# Patient Record
Sex: FEMALE | Race: WHITE | NOT HISPANIC OR LATINO | Employment: FULL TIME | URBAN - METROPOLITAN AREA
[De-identification: names, ages, dates, MRNs, and addresses within clinical notes are randomized per-mention and may not be internally consistent; named-entity substitution may affect disease eponyms.]

---

## 2024-07-26 ENCOUNTER — HOSPITAL ENCOUNTER (EMERGENCY)
Facility: HOSPITAL | Age: 49
Discharge: HOME | End: 2024-07-26
Attending: EMERGENCY MEDICINE
Payer: COMMERCIAL

## 2024-07-26 ENCOUNTER — APPOINTMENT (EMERGENCY)
Dept: RADIOLOGY | Facility: HOSPITAL | Age: 49
End: 2024-07-26
Payer: COMMERCIAL

## 2024-07-26 VITALS
TEMPERATURE: 97.9 F | HEART RATE: 51 BPM | RESPIRATION RATE: 13 BRPM | SYSTOLIC BLOOD PRESSURE: 135 MMHG | OXYGEN SATURATION: 100 % | DIASTOLIC BLOOD PRESSURE: 64 MMHG

## 2024-07-26 DIAGNOSIS — M79.89 LEG SWELLING: ICD-10-CM

## 2024-07-26 DIAGNOSIS — R07.9 CHEST PAIN: Primary | ICD-10-CM

## 2024-07-26 LAB
2HR DELTA HS TROPONIN: <-1 NG/L
ALBUMIN SERPL BCG-MCNC: 3.9 G/DL (ref 3.5–5)
ALP SERPL-CCNC: 113 U/L (ref 34–104)
ALT SERPL W P-5'-P-CCNC: 8 U/L (ref 7–52)
ANION GAP SERPL CALCULATED.3IONS-SCNC: 4 MMOL/L (ref 4–13)
APTT PPP: 35 SECONDS (ref 23–37)
AST SERPL W P-5'-P-CCNC: 11 U/L (ref 13–39)
ATRIAL RATE: 64 BPM
BASOPHILS # BLD AUTO: 0.08 THOUSANDS/ÂΜL (ref 0–0.1)
BASOPHILS NFR BLD AUTO: 1 % (ref 0–1)
BILIRUB SERPL-MCNC: 0.33 MG/DL (ref 0.2–1)
BNP SERPL-MCNC: 275 PG/ML (ref 0–100)
BUN SERPL-MCNC: 4 MG/DL (ref 5–25)
CALCIUM SERPL-MCNC: 8.7 MG/DL (ref 8.4–10.2)
CARDIAC TROPONIN I PNL SERPL HS: 3 NG/L
CARDIAC TROPONIN I PNL SERPL HS: <2 NG/L
CHLORIDE SERPL-SCNC: 107 MMOL/L (ref 96–108)
CO2 SERPL-SCNC: 28 MMOL/L (ref 21–32)
CREAT SERPL-MCNC: 0.63 MG/DL (ref 0.6–1.3)
EOSINOPHIL # BLD AUTO: 0.43 THOUSAND/ÂΜL (ref 0–0.61)
EOSINOPHIL NFR BLD AUTO: 5 % (ref 0–6)
ERYTHROCYTE [DISTWIDTH] IN BLOOD BY AUTOMATED COUNT: 18.6 % (ref 11.6–15.1)
GFR SERPL CREATININE-BSD FRML MDRD: 106 ML/MIN/1.73SQ M
GLUCOSE SERPL-MCNC: 93 MG/DL (ref 65–140)
HCT VFR BLD AUTO: 29.5 % (ref 34.8–46.1)
HGB BLD-MCNC: 8.2 G/DL (ref 11.5–15.4)
IMM GRANULOCYTES # BLD AUTO: 0.04 THOUSAND/UL (ref 0–0.2)
IMM GRANULOCYTES NFR BLD AUTO: 0 % (ref 0–2)
INR PPP: 1.02 (ref 0.84–1.19)
LYMPHOCYTES # BLD AUTO: 1.74 THOUSANDS/ÂΜL (ref 0.6–4.47)
LYMPHOCYTES NFR BLD AUTO: 19 % (ref 14–44)
MCH RBC QN AUTO: 20.7 PG (ref 26.8–34.3)
MCHC RBC AUTO-ENTMCNC: 27.8 G/DL (ref 31.4–37.4)
MCV RBC AUTO: 74 FL (ref 82–98)
MONOCYTES # BLD AUTO: 0.98 THOUSAND/ÂΜL (ref 0.17–1.22)
MONOCYTES NFR BLD AUTO: 11 % (ref 4–12)
NEUTROPHILS # BLD AUTO: 5.84 THOUSANDS/ÂΜL (ref 1.85–7.62)
NEUTS SEG NFR BLD AUTO: 64 % (ref 43–75)
NRBC BLD AUTO-RTO: 0 /100 WBCS
P AXIS: 12 DEGREES
PLATELET # BLD AUTO: 385 THOUSANDS/UL (ref 149–390)
PMV BLD AUTO: 8.5 FL (ref 8.9–12.7)
POTASSIUM SERPL-SCNC: 3.4 MMOL/L (ref 3.5–5.3)
PR INTERVAL: 120 MS
PROT SERPL-MCNC: 6.9 G/DL (ref 6.4–8.4)
PROTHROMBIN TIME: 13.8 SECONDS (ref 11.6–14.5)
QRS AXIS: 57 DEGREES
QRSD INTERVAL: 78 MS
QT INTERVAL: 420 MS
QTC INTERVAL: 433 MS
RBC # BLD AUTO: 3.97 MILLION/UL (ref 3.81–5.12)
SODIUM SERPL-SCNC: 139 MMOL/L (ref 135–147)
T WAVE AXIS: -62 DEGREES
VENTRICULAR RATE: 64 BPM
WBC # BLD AUTO: 9.11 THOUSAND/UL (ref 4.31–10.16)

## 2024-07-26 PROCEDURE — 99285 EMERGENCY DEPT VISIT HI MDM: CPT

## 2024-07-26 PROCEDURE — 93010 ELECTROCARDIOGRAM REPORT: CPT | Performed by: INTERNAL MEDICINE

## 2024-07-26 PROCEDURE — 71046 X-RAY EXAM CHEST 2 VIEWS: CPT

## 2024-07-26 PROCEDURE — 80053 COMPREHEN METABOLIC PANEL: CPT

## 2024-07-26 PROCEDURE — 85025 COMPLETE CBC W/AUTO DIFF WBC: CPT

## 2024-07-26 PROCEDURE — 96360 HYDRATION IV INFUSION INIT: CPT

## 2024-07-26 PROCEDURE — 85610 PROTHROMBIN TIME: CPT

## 2024-07-26 PROCEDURE — 36415 COLL VENOUS BLD VENIPUNCTURE: CPT

## 2024-07-26 PROCEDURE — 93005 ELECTROCARDIOGRAM TRACING: CPT

## 2024-07-26 PROCEDURE — 84484 ASSAY OF TROPONIN QUANT: CPT

## 2024-07-26 PROCEDURE — 83880 ASSAY OF NATRIURETIC PEPTIDE: CPT

## 2024-07-26 PROCEDURE — 85730 THROMBOPLASTIN TIME PARTIAL: CPT

## 2024-07-26 RX ADMIN — SODIUM CHLORIDE 1000 ML: 0.9 INJECTION, SOLUTION INTRAVENOUS at 09:37

## 2024-07-26 NOTE — DISCHARGE INSTRUCTIONS
I have placed orders for referral to both primary care and cardiology.  Please follow-up with both so you may be reevaluated for your episodes of chest pain and transient leg swelling.  Return to the ER if develop severe chest pain, difficulty breathing, vomiting, blood in vomit or stool, new persistent leg swelling, weakness, confusion, or lethargy.

## 2024-07-26 NOTE — ED PROVIDER NOTES
"History  Chief Complaint   Patient presents with    Chest Pain     Pt states that less than an hour ago \"I was at work and I felt this sharp chest pain\" Pt reports slight sob. Denies any cardiac history. Denies any radiating pain today.      Swetha is a 49-year-old female with significant past medical history of hypertension and hyperlipidemia presenting to the ED today for chest pain this morning. She reports a similar episode that was less severe at approximately lunchtime yesterday. She describes the pain this morning as a sharp stabbing, nonradiating left-sided pain. The pain started approximately 8 AM this morning and lasted about 5 minutes but has since resolved. The symptoms occurred while at rest on her way to work this morning. The symptoms do not worsen with exertion. She has never had anything like this before. No significant cardiac history. She does report associated nausea and lightheadedness  but no vomiting, fevers, chills, or diaphoresis. She does report a ED visit to [facility] for rectal bleeding 2 weeks ago. She is no longer having loose stools and denies hematochezia or melena.  Colonoscopy scheduled for 8/12. She does report associated lightheadedness since then that is worse with standing.  No loss consciousness or syncopal episodes. She has not eaten today but did have some Mountain Dew. She does smoke a pack per day. No cough or shortness of breath. No recent illness or sick contacts.  No palpitations, leg swelling, headache, abdominal pain or vomiting. No recent travel or exogenous estrogen.  No recent surgical history or trauma.  No history of DVT.      Chest Pain  Associated symptoms: nausea    Associated symptoms: no abdominal pain, no back pain, no cough, no diaphoresis, no dizziness, no fever, no headache, no numbness, no palpitations, no shortness of breath, not vomiting and no weakness        None       Past Medical History:   Diagnosis Date    Anxiety        History reviewed. " No pertinent surgical history.    History reviewed. No pertinent family history.  I have reviewed and agree with the history as documented.    E-Cigarette/Vaping     E-Cigarette/Vaping Substances     Social History     Tobacco Use    Smoking status: Every Day     Current packs/day: 1.00     Types: Cigarettes    Smokeless tobacco: Never       Review of Systems   Constitutional:  Negative for chills, diaphoresis and fever.   HENT:  Negative for congestion and sore throat.    Eyes:  Negative for visual disturbance.   Respiratory:  Negative for cough and shortness of breath.    Cardiovascular:  Positive for chest pain. Negative for palpitations and leg swelling.   Gastrointestinal:  Positive for nausea. Negative for abdominal pain, blood in stool, diarrhea and vomiting.   Genitourinary:  Negative for difficulty urinating, dysuria and hematuria.   Musculoskeletal:  Negative for back pain, neck pain and neck stiffness.   Skin:  Negative for color change.   Neurological:  Positive for light-headedness. Negative for dizziness, syncope, weakness, numbness and headaches.   All other systems reviewed and are negative.      Physical Exam  Physical Exam  Vitals and nursing note reviewed.   Constitutional:       General: She is not in acute distress.     Appearance: Normal appearance. She is well-developed and well-groomed. She is not ill-appearing or diaphoretic.   HENT:      Head: Normocephalic and atraumatic.   Eyes:      Conjunctiva/sclera: Conjunctivae normal.   Cardiovascular:      Rate and Rhythm: Normal rate and regular rhythm. No extrasystoles are present.     Pulses:           Radial pulses are 2+ on the right side and 2+ on the left side.      Heart sounds: Normal heart sounds. Heart sounds not distant. No murmur heard.     No systolic murmur is present.      No friction rub. No gallop.   Pulmonary:      Effort: Pulmonary effort is normal. No tachypnea, accessory muscle usage or respiratory distress.      Breath  sounds: Normal breath sounds. No decreased breath sounds, wheezing, rhonchi or rales.   Chest:      Chest wall: No mass, deformity, tenderness or crepitus.   Abdominal:      General: Abdomen is flat. Bowel sounds are normal. There is no distension.      Palpations: Abdomen is soft. There is no mass.      Tenderness: There is no abdominal tenderness. There is no guarding or rebound.   Musculoskeletal:      Cervical back: Normal range of motion and neck supple.      Right lower leg: No tenderness. No edema.      Left lower leg: No tenderness. No edema.   Lymphadenopathy:      Cervical: No cervical adenopathy.   Skin:     General: Skin is warm.      Coloration: Skin is not pale.   Neurological:      General: No focal deficit present.      Mental Status: She is alert.      GCS: GCS eye subscore is 4. GCS verbal subscore is 5. GCS motor subscore is 6.   Psychiatric:         Behavior: Behavior is cooperative.         Vital Signs  ED Triage Vitals   Temperature Pulse Respirations Blood Pressure SpO2   07/26/24 0902 07/26/24 0902 07/26/24 0902 07/26/24 0902 07/26/24 0902   97.9 °F (36.6 °C) 65 20 146/72 99 %      Temp Source Heart Rate Source Patient Position - Orthostatic VS BP Location FiO2 (%)   07/26/24 0902 07/26/24 0902 07/26/24 0902 07/26/24 0902 --   Temporal Monitor Sitting Right arm       Pain Score       07/26/24 0858       10 - Worst Possible Pain           Vitals:    07/26/24 1030 07/26/24 1130 07/26/24 1200 07/26/24 1230   BP: 115/58 124/58 135/65 135/64   Pulse: 56 (!) 54 55 (!) 51   Patient Position - Orthostatic VS:             Visual Acuity      ED Medications  Medications   sodium chloride 0.9 % bolus 1,000 mL (0 mL Intravenous Stopped 7/26/24 1037)       Diagnostic Studies  Results Reviewed       Procedure Component Value Units Date/Time    HS Troponin I 2hr [[id number]]  (Normal) Collected: 07/26/24 1112    Lab Status: Final result Specimen: Blood from Arm, Left Updated: 07/26/24 1203     hs TnI 2hr <2  ng/L      Delta 2hr hsTnI <-1 ng/L     B-Type Natriuretic Peptide(BNP) [[id number]]  (Abnormal) Collected: 07/26/24 0907    Lab Status: Final result Specimen: Blood from Arm, Left Updated: 07/26/24 1052      pg/mL     Protime-INR [[id number]]  (Normal) Collected: 07/26/24 0938    Lab Status: Final result Specimen: Blood from Arm, Left Updated: 07/26/24 1012     Protime 13.8 seconds      INR 1.02    APTT [[id number]]  (Normal) Collected: 07/26/24 0938    Lab Status: Final result Specimen: Blood from Arm, Left Updated: 07/26/24 1012     PTT 35 seconds     HS Troponin 0hr (reflex protocol) [[id number]]  (Normal) Collected: 07/26/24 0907    Lab Status: Final result Specimen: Blood from Arm, Left Updated: 07/26/24 0939     hs TnI 0hr 3 ng/L     Comprehensive metabolic panel [[id number]]  (Abnormal) Collected: 07/26/24 0907    Lab Status: Final result Specimen: Blood from Arm, Left Updated: 07/26/24 0932     Sodium 139 mmol/L      Potassium 3.4 mmol/L      Chloride 107 mmol/L      CO2 28 mmol/L      ANION GAP 4 mmol/L      BUN 4 mg/dL      Creatinine 0.63 mg/dL      Glucose 93 mg/dL      Calcium 8.7 mg/dL      AST 11 U/L      ALT 8 U/L      Alkaline Phosphatase 113 U/L      Total Protein 6.9 g/dL      Albumin 3.9 g/dL      Total Bilirubin 0.33 mg/dL      eGFR 106 ml/min/1.73sq m     Narrative:      National Kidney Disease Foundation guidelines for Chronic Kidney Disease (CKD):     Stage 1 with normal or high GFR (GFR > 90 mL/min/1.73 square meters)    Stage 2 Mild CKD (GFR = 60-89 mL/min/1.73 square meters)    Stage 3A Moderate CKD (GFR = 45-59 mL/min/1.73 square meters)    Stage 3B Moderate CKD (GFR = 30-44 mL/min/1.73 square meters)    Stage 4 Severe CKD (GFR = 15-29 mL/min/1.73 square meters)    Stage 5 End Stage CKD (GFR <15 mL/min/1.73 square meters)  Note: GFR calculation is accurate only with a steady state creatinine    CBC and differential [[id number]]  (Abnormal) Collected: 07/26/24 0907    Lab Status: Final  result Specimen: Blood from Arm, Left Updated: 07/26/24 0916     WBC 9.11 Thousand/uL      RBC 3.97 Million/uL      Hemoglobin 8.2 g/dL      Hematocrit 29.5 %      MCV 74 fL      MCH 20.7 pg      MCHC 27.8 g/dL      RDW 18.6 %      MPV 8.5 fL      Platelets 385 Thousands/uL      nRBC 0 /100 WBCs      Segmented % 64 %      Immature Grans % 0 %      Lymphocytes % 19 %      Monocytes % 11 %      Eosinophils Relative 5 %      Basophils Relative 1 %      Absolute Neutrophils 5.84 Thousands/µL      Absolute Immature Grans 0.04 Thousand/uL      Absolute Lymphocytes 1.74 Thousands/µL      Absolute Monocytes 0.98 Thousand/µL      Eosinophils Absolute 0.43 Thousand/µL      Basophils Absolute 0.08 Thousands/µL                    XR chest 2 views   ED Interpretation by ECE Luna (07/26 0939)   No acute cardiopulmonary disease identified by me.      Final Result by Ninfa Ray MD (07/26 1255)      No acute cardiopulmonary disease.            Workstation performed: [id number]                    Procedures  ECG 12 Lead Documentation Only    Date/Time: 7/26/2024 9:09 AM    Performed by: CEE Luna  Authorized by: CEE Luna    Indications / Diagnosis:  CP  ECG reviewed by me, the ED Provider: yes    Patient location:  ED  Previous ECG:     Previous ECG:  Unavailable    Comparison to cardiac monitor: Yes    Interpretation:     Interpretation: abnormal    Rate:     ECG rate:  64    ECG rate assessment: normal    Rhythm:     Rhythm: sinus rhythm    Ectopy:     Ectopy: none    QRS:     QRS axis:  Normal    QRS intervals:  Normal  Conduction:     Conduction: normal    ST segments:     ST segments:  Normal  T waves:     T waves: non-specific and inverted      Inverted:  II, III, aVF, V3, V4 and V5  Comments:      Sinus rhythm, normal axis, normal intervals, nonspecific T wave abnormalities           ED Course  ED Course as of 07/26/24 1629   Fri Jul 26, 2024   0918 Hemoglobin(!): 8.2  Reported anemia 2  weeks ago when at [facility] having right red blood per rectum, has since resolved; no PRBC infusion given   0921 Last saw PCP 1 month ago; sees GI 8/12/24   0923 7/3/24: hgb 9.2  7/8/24:hgb 9.3   0942 hs TnI 0hr: 3  Will delta given pain started 1h PTA; less concerning for ACS given currently asymptomatic   1258 XR chest 2 views  IMPRESSION:     No acute cardiopulmonary disease.                 HEART Risk Score      Flowsheet Row Most Recent Value   Heart Score Risk Calculator    History 0 Filed at: 07/26/2024 1205   ECG 1 Filed at: 07/26/2024 1205   Age 1 Filed at: 07/26/2024 1205   Risk Factors 2 Filed at: 07/26/2024 1205   Troponin 0 Filed at: 07/26/2024 1205   HEART Score 4 Filed at: 07/26/2024 1205                  PERC Rule for PE      Flowsheet Row Most Recent Value   PERC Rule for PE    Age >=50 0 Filed at: 07/26/2024 0917   HR >=100 0 Filed at: 07/26/2024 0917   O2 Sat on room air < 95% 0 Filed at: 07/26/2024 0917   History of PE or DVT 0 Filed at: 07/26/2024 0917   Recent trauma or surgery 0 Filed at: 07/26/2024 0917   Hemoptysis 0 Filed at: 07/26/2024 0917   Exogenous estrogen 0 Filed at: 07/26/2024 0917   Unilateral leg swelling 0 Filed at: 07/26/2024 0917   PERC Rule for PE Results 0 Filed at: 07/26/2024 0917                    Wells' Criteria for PE      Flowsheet Row Most Recent Value   Wells' Criteria for PE    Clinical signs and symptoms of DVT 0 Filed at: 07/26/2024 0916   PE is primary diagnosis or equally likely 0 Filed at: 07/26/2024 0916   HR >100 0 Filed at: 07/26/2024 0916   Immobilization at least 3 days or Surgery in the previous 4 weeks 0 Filed at: 07/26/2024 0916   Previous, objectively diagnosed PE or DVT 0 Filed at: 07/26/2024 0916   Hemoptysis 0 Filed at: 07/26/2024 0916   Malignancy with treatment within 6 months or palliative 0 Filed at: 07/26/2024 0916   Wells' Criteria Total 0 Filed at: 07/26/2024 0916                  Medical Decision Making  DDX including but not limited  to: GERD, esophageal spasm, ACS, MI, pneumonia, considered less likely new onset CHF    EKG with nonspecific findings, troponins negative.  No recurrence of chest pain while monitored.  No cardiac arrhythmia on continuous monitoring.  Remainder of labs reviewed and with note of hemoglobin of 8.2 with microcytic and hypochromic differential.  Suspect her GI losses 2 weeks ago may be the contributing cause to this.  Her prior hemoglobin was 1 point higher.  She currently is asymptomatic and without active bleeding.  No need to emergently transfuse here.  She does have follow-up with colonoscopy with GI in 2 weeks.  At this time, cardiac causes less likely, however given no prior cardiac testing, heart score, and mildly elevated BNP with transient leg swelling yesterday, will place referral for cardiology for close follow-up.      Problems Addressed:  Chest pain: acute illness or injury  Leg swelling: self-limited or minor problem    Amount and/or Complexity of Data Reviewed  Labs: ordered. Decision-making details documented in ED Course.  Radiology: ordered and independent interpretation performed. Decision-making details documented in ED Course.  ECG/medicine tests: ordered and independent interpretation performed.                 Disposition  Final diagnoses:   Chest pain   Leg swelling     Time reflects when diagnosis was documented in both MDM as applicable and the Disposition within this note       Time User Action Codes Description Comment    7/26/2024 12:27 PM Shruti Brown Add [R07.9] Chest pain     7/26/2024 12:27 PM Shruti Brown Add [M79.89] Leg swelling           ED Disposition       ED Disposition   Discharge    Condition   Stable    Date/Time   Fri Jul 26, 2024 1211    Comment   Swetha Mueller discharge to home/self care.                   Follow-up Information       Follow up With Specialties Details Why Contact Info Additional Information    [facility] Schedule  an appointment as soon as possible for a visit in 1 week  [address]  [facility] [address] [phone] [facility] [place], [address] [place] [address] [address], [place], [place], [address] [phone]     [facility] Emergency Department Emergency Medicine Go to  If symptoms worsen [address] [facility] [address] [phone] [facility] Emergency Department, [address], [place], [place] [address]            There are no discharge medications for this patient.          PDMP Review       None            ED Provider  Electronically Signed by             CEE Luna  07/26/24 [id number]

## 2024-08-02 ENCOUNTER — TELEPHONE (OUTPATIENT)
Dept: CARDIOLOGY CLINIC | Facility: CLINIC | Age: 49
End: 2024-08-02

## 2024-08-02 ENCOUNTER — TELEPHONE (OUTPATIENT)
Age: 49
End: 2024-08-02

## 2024-08-02 NOTE — TELEPHONE ENCOUNTER
Lm for pt to call we have to cx her apt with Dr Mcknight her insur [employer] is an insur we don't accept in this office.

## 2024-08-02 NOTE — TELEPHONE ENCOUNTER
Entered patients chart to verify PCP from ambulatory referral to FM/IM.  Patient is current patient of Dr. Ricarda Bolanos at [facility]